# Patient Record
Sex: MALE | Race: ASIAN | NOT HISPANIC OR LATINO | URBAN - METROPOLITAN AREA
[De-identification: names, ages, dates, MRNs, and addresses within clinical notes are randomized per-mention and may not be internally consistent; named-entity substitution may affect disease eponyms.]

---

## 2024-05-19 ENCOUNTER — OFFICE VISIT (OUTPATIENT)
Dept: URGENT CARE | Facility: CLINIC | Age: 39
End: 2024-05-19
Payer: COMMERCIAL

## 2024-05-19 VITALS
TEMPERATURE: 98 F | DIASTOLIC BLOOD PRESSURE: 75 MMHG | WEIGHT: 144 LBS | OXYGEN SATURATION: 96 % | HEIGHT: 63 IN | BODY MASS INDEX: 25.52 KG/M2 | HEART RATE: 85 BPM | SYSTOLIC BLOOD PRESSURE: 111 MMHG

## 2024-05-19 DIAGNOSIS — K12.1 STOMATITIS: Primary | ICD-10-CM

## 2024-05-19 PROCEDURE — 99203 OFFICE O/P NEW LOW 30 MIN: CPT | Performed by: PHYSICIAN ASSISTANT

## 2024-05-19 PROCEDURE — 1036F TOBACCO NON-USER: CPT | Performed by: PHYSICIAN ASSISTANT

## 2024-05-19 RX ORDER — DEXAMETHASONE 0.5 MG/5ML
ELIXIR ORAL
Qty: 100 ML | Refills: 0 | Status: SHIPPED | OUTPATIENT
Start: 2024-05-19 | End: 2024-05-24

## 2024-09-27 ENCOUNTER — OFFICE VISIT (OUTPATIENT)
Dept: URGENT CARE | Facility: CLINIC | Age: 39
End: 2024-09-27
Payer: COMMERCIAL

## 2024-09-27 VITALS
HEIGHT: 63 IN | HEART RATE: 79 BPM | BODY MASS INDEX: 26.29 KG/M2 | OXYGEN SATURATION: 98 % | SYSTOLIC BLOOD PRESSURE: 123 MMHG | WEIGHT: 148.4 LBS | TEMPERATURE: 97.5 F | DIASTOLIC BLOOD PRESSURE: 83 MMHG

## 2024-09-27 DIAGNOSIS — M54.41 CHRONIC RIGHT-SIDED LOW BACK PAIN WITH RIGHT-SIDED SCIATICA: Primary | ICD-10-CM

## 2024-09-27 DIAGNOSIS — G89.29 CHRONIC RIGHT-SIDED LOW BACK PAIN WITH RIGHT-SIDED SCIATICA: Primary | ICD-10-CM

## 2024-09-27 RX ORDER — OMEPRAZOLE 10 MG/1
10 CAPSULE, DELAYED RELEASE ORAL
COMMUNITY

## 2024-09-27 RX ORDER — TIZANIDINE 2 MG/1
2 TABLET ORAL NIGHTLY
Qty: 10 TABLET | Refills: 0 | Status: SHIPPED | OUTPATIENT
Start: 2024-09-27 | End: 2024-10-07

## 2024-09-27 RX ORDER — KETOROLAC TROMETHAMINE 30 MG/ML
60 INJECTION, SOLUTION INTRAMUSCULAR; INTRAVENOUS ONCE
Status: COMPLETED | OUTPATIENT
Start: 2024-09-27 | End: 2024-09-27

## 2024-09-27 RX ORDER — IBUPROFEN 600 MG/1
600 TABLET ORAL EVERY 8 HOURS PRN
Qty: 30 TABLET | Refills: 0 | Status: SHIPPED | OUTPATIENT
Start: 2024-09-27 | End: 2024-10-07

## 2024-09-27 ASSESSMENT — ENCOUNTER SYMPTOMS
BOWEL INCONTINENCE: 0
HEADACHES: 0
TINGLING: 0
FEVER: 0
LEG PAIN: 1
PARESTHESIAS: 0
WEAKNESS: 0
PARESIS: 0
BACK PAIN: 1
NUMBNESS: 0
DYSURIA: 0
WEIGHT LOSS: 0
PERIANAL NUMBNESS: 0
ABDOMINAL PAIN: 0

## 2024-09-27 NOTE — PROGRESS NOTES
Subjective   History  Najma Yonatan is a 39 y.o. male who presents for Back Pain.    States that when he gets up from sitting down and when standing/sitting straight, there is pain. States that at work, he has to lift heavy and he also has to climb ladders. He was seen around 2 months ago when this started, was given medication- states that there was a shot and IBU to go home with. No imaging was done at this time. Declines wanting imaging orders. He is basically needing refills of the medications he was given. He has been using ice on the area with little relief, sometimes is better but now is not helping. The pain radiates down the right leg.     He has a follow up with [facility] 11/8/24.       History provided by:  Patient   used: No    Back Pain  This is a new problem. The current episode started more than 1 month ago. The problem occurs constantly. The problem has been gradually worsening since onset. The quality of the pain is described as shooting. The pain is at a severity of 9/10. The pain is severe. The symptoms are aggravated by lying down, sitting, twisting, standing and bending. Stiffness is present All day. Associated symptoms include leg pain. Pertinent negatives include no abdominal pain, bladder incontinence, bowel incontinence, chest pain, dysuria, fever, headaches, numbness, paresis, paresthesias, pelvic pain, perianal numbness, tingling, weakness or weight loss. He has tried ice and NSAIDs for the symptoms. The treatment provided no relief.     No past surgical history on file.  Social History     Tobacco Use    Smoking status: Never    Smokeless tobacco: Never       Review of Systems   Review of Systems   Constitutional:  Negative for fever and weight loss.   Cardiovascular:  Negative for chest pain.   Gastrointestinal:  Negative for abdominal pain and bowel incontinence.   Genitourinary:  Negative for bladder incontinence, dysuria and pelvic pain.  "  Musculoskeletal:  Positive for back pain.   Neurological:  Negative for tingling, weakness, numbness, headaches and paresthesias.       Objective   Vital Signs  /83 (BP Location: Left arm, Patient Position: Sitting, BP Cuff Size: Small adult)   Pulse 79   Temp 36.4 °C (97.5 °F) (Oral)   Ht 1.6 m (5' 3\")   Wt 67.3 kg (148 lb 6.4 oz)   SpO2 98%   BMI 26.29 kg/m²    All vitals have been reviewed and are stable.     Physical Exam  Vitals reviewed.   Constitutional:       General: He is awake.      Appearance: Normal appearance. He is well-developed.   HENT:      Head: Normocephalic and atraumatic.   Cardiovascular:      Rate and Rhythm: Normal rate.   Pulmonary:      Effort: Pulmonary effort is normal.   Musculoskeletal:      Cervical back: Full passive range of motion without pain.      Lumbar back: Spasms and tenderness (paralumbar muscles) present. No swelling or bony tenderness. Decreased range of motion. Negative right straight leg raise test and negative left straight leg raise test. No scoliosis.      Right lower leg: No edema.      Left lower leg: No edema.   Skin:     General: Skin is warm and dry.      Findings: No lesion or rash.   Neurological:      General: No focal deficit present.      Mental Status: He is alert and oriented to person, place, and time.      Cranial Nerves: No facial asymmetry.      Motor: Motor function is intact.      Gait: Gait is intact.   Psychiatric:         Attention and Perception: Attention normal.         Mood and Affect: Mood and affect normal.       Assessment/Plan     Problem List Items Addressed This Visit    None  Visit Diagnoses       Chronic right-sided low back pain with right-sided sciatica    -  Primary    Relevant Medications    ketorolac (Toradol) injection 60 mg (Completed)    ibuprofen 600 mg tablet    tiZANidine (Zanaflex) 2 mg tablet          Toradol IM injection given   Ibuprofen Rx for outpatient pain control - do not begin until 24+ hours after " Toradol injection  Small course of tizanidine 2 mg to be used at bedtime if needed  Continue with PCP follow up on 11/8  Offered to order XR of L-spine, pt declined at this time.     Red flag symptoms reviewed with patient and all questions answered. Patient or parent/guardian verbalized understanding and agreement with care plan as above. All in office testing reviewed with patient. If symptoms worsen or do not improve, patient is to follow up with PCP or report to the ER.